# Patient Record
Sex: MALE | Race: WHITE | NOT HISPANIC OR LATINO | Employment: FULL TIME | URBAN - METROPOLITAN AREA
[De-identification: names, ages, dates, MRNs, and addresses within clinical notes are randomized per-mention and may not be internally consistent; named-entity substitution may affect disease eponyms.]

---

## 2023-06-01 ENCOUNTER — OCCUPATIONAL HEALTH (OUTPATIENT)
Dept: URGENT CARE | Facility: CLINIC | Age: 53
End: 2023-06-01

## 2023-06-01 DIAGNOSIS — Z02.1 PRE-EMPLOYMENT EXAMINATION: Primary | ICD-10-CM

## 2023-06-01 LAB
CTP QC/QA: YES
FECAL OCCULT BLOOD, POC: NEGATIVE

## 2023-06-01 PROCEDURE — 92552 AUDIOGRAM OCC MED: ICD-10-PCS | Mod: S$GLB,,, | Performed by: NURSE PRACTITIONER

## 2023-06-01 PROCEDURE — 80061 LIPID PANEL: ICD-10-PCS | Mod: QW,S$GLB,, | Performed by: NURSE PRACTITIONER

## 2023-06-01 PROCEDURE — 99499 PHYSICAL, BASIC COMPLEXITY: ICD-10-PCS | Mod: S$GLB,,, | Performed by: NURSE PRACTITIONER

## 2023-06-01 PROCEDURE — 82270 OCCULT BLOOD FECES: CPT | Mod: S$GLB,,, | Performed by: NURSE PRACTITIONER

## 2023-06-01 PROCEDURE — 86592 RPR: ICD-10-PCS | Mod: S$GLB,,, | Performed by: NURSE PRACTITIONER

## 2023-06-01 PROCEDURE — 92552 PURE TONE AUDIOMETRY AIR: CPT | Mod: S$GLB,,, | Performed by: NURSE PRACTITIONER

## 2023-06-01 PROCEDURE — 82270 POCT OCCULT BLOOD STOOL: ICD-10-PCS | Mod: S$GLB,,, | Performed by: NURSE PRACTITIONER

## 2023-06-01 PROCEDURE — 86592 SYPHILIS TEST NON-TREP QUAL: CPT | Mod: S$GLB,,, | Performed by: NURSE PRACTITIONER

## 2023-06-01 PROCEDURE — 80305 OOH NON-DOT DRUG SCREEN: ICD-10-PCS | Mod: S$GLB,,, | Performed by: NURSE PRACTITIONER

## 2023-06-01 PROCEDURE — 99499 UNLISTED E&M SERVICE: CPT | Mod: S$GLB,,, | Performed by: NURSE PRACTITIONER

## 2023-06-01 PROCEDURE — 80305 DRUG TEST PRSMV DIR OPT OBS: CPT | Mod: S$GLB,,, | Performed by: NURSE PRACTITIONER

## 2023-06-01 PROCEDURE — 87389 HIV-1 AG W/HIV-1&-2 AB AG IA: CPT | Mod: QW,S$GLB,, | Performed by: NURSE PRACTITIONER

## 2023-06-01 PROCEDURE — 82977 ASSAY OF GGT: CPT | Mod: QW,S$GLB,, | Performed by: NURSE PRACTITIONER

## 2023-06-01 PROCEDURE — 82977 GAMMA GT: ICD-10-PCS | Mod: QW,S$GLB,, | Performed by: NURSE PRACTITIONER

## 2023-06-01 PROCEDURE — 86580 TB INTRADERMAL TEST: CPT | Mod: S$GLB,,, | Performed by: NURSE PRACTITIONER

## 2023-06-01 PROCEDURE — 87389 HIV 1 / 2 ANTIBODY: ICD-10-PCS | Mod: QW,S$GLB,, | Performed by: NURSE PRACTITIONER

## 2023-06-01 PROCEDURE — 80053 COMPREHEN METABOLIC PANEL: CPT | Mod: QW,S$GLB,, | Performed by: NURSE PRACTITIONER

## 2023-06-01 PROCEDURE — 86580 POCT TB SKIN TEST: ICD-10-PCS | Mod: S$GLB,,, | Performed by: NURSE PRACTITIONER

## 2023-06-01 PROCEDURE — 80061 LIPID PANEL: CPT | Mod: QW,S$GLB,, | Performed by: NURSE PRACTITIONER

## 2023-06-01 PROCEDURE — 97750 PHYSICAL PERFORMANCE TEST: CPT | Mod: S$GLB,,, | Performed by: NURSE PRACTITIONER

## 2023-06-01 PROCEDURE — 97750 STRENGTH & FLEX: ICD-10-PCS | Mod: S$GLB,,, | Performed by: NURSE PRACTITIONER

## 2023-06-01 PROCEDURE — 80053 COMPREHENSIVE METABOLIC PANEL: ICD-10-PCS | Mod: QW,S$GLB,, | Performed by: NURSE PRACTITIONER

## 2023-06-02 ENCOUNTER — TELEPHONE (OUTPATIENT)
Dept: URGENT CARE | Facility: CLINIC | Age: 53
End: 2023-06-02

## 2023-06-03 LAB
TB INDURATION - 48 HR READ: 0 MM
TB INDURATION - 72 HR READ: 0 MM
TB SKIN TEST - 48 HR READ: NEGATIVE
TB SKIN TEST - 72 HR READ: NEGATIVE

## 2024-07-10 ENCOUNTER — OFFICE VISIT (OUTPATIENT)
Dept: URGENT CARE | Facility: CLINIC | Age: 54
End: 2024-07-10
Payer: COMMERCIAL

## 2024-07-10 VITALS
BODY MASS INDEX: 39.2 KG/M2 | SYSTOLIC BLOOD PRESSURE: 170 MMHG | HEART RATE: 88 BPM | TEMPERATURE: 99 F | WEIGHT: 280 LBS | RESPIRATION RATE: 19 BRPM | OXYGEN SATURATION: 97 % | DIASTOLIC BLOOD PRESSURE: 96 MMHG | HEIGHT: 71 IN
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
## 2024-07-10 DIAGNOSIS — S69.92XA INJURY OF LEFT FOREARM AND WRIST, INITIAL ENCOUNTER: ICD-10-CM

## 2024-07-10 DIAGNOSIS — Z02.6 ENCOUNTER RELATED TO WORKER'S COMPENSATION CLAIM: ICD-10-CM

## 2024-07-10 DIAGNOSIS — M62.838 MUSCLE SPASM: ICD-10-CM

## 2024-07-10 DIAGNOSIS — S59.912A INJURY OF LEFT FOREARM AND WRIST, INITIAL ENCOUNTER: ICD-10-CM

## 2024-07-10 DIAGNOSIS — G56.22 CUBITAL TUNNEL SYNDROME ON LEFT: ICD-10-CM

## 2024-07-10 DIAGNOSIS — S13.9XXA NECK SPRAIN, INITIAL ENCOUNTER: ICD-10-CM

## 2024-07-10 DIAGNOSIS — S40.812A ABRASION OF LEFT UPPER EXTREMITY, INITIAL ENCOUNTER: ICD-10-CM

## 2024-07-10 DIAGNOSIS — S33.5XXA SPRAIN OF LOW BACK, INITIAL ENCOUNTER: ICD-10-CM

## 2024-07-10 RX ORDER — FLUTICASONE PROPIONATE 50 MCG
2 SPRAY, SUSPENSION (ML) NASAL DAILY
COMMUNITY
Start: 2023-10-17

## 2024-07-10 RX ORDER — MINOXIDIL 2.5 MG/1
TABLET ORAL
COMMUNITY

## 2024-07-10 RX ORDER — NAPROXEN 500 MG/1
500 TABLET ORAL 2 TIMES DAILY PRN
COMMUNITY
Start: 2023-11-14 | End: 2024-07-11

## 2024-07-10 RX ORDER — MUPIROCIN 20 MG/G
OINTMENT TOPICAL 2 TIMES DAILY
COMMUNITY
Start: 2024-05-26

## 2024-07-10 RX ORDER — KETOCONAZOLE 20 MG/G
CREAM TOPICAL
COMMUNITY

## 2024-07-10 RX ORDER — KETOROLAC TROMETHAMINE 30 MG/ML
30 INJECTION, SOLUTION INTRAMUSCULAR; INTRAVENOUS
Status: COMPLETED | OUTPATIENT
Start: 2024-07-10 | End: 2024-07-10

## 2024-07-10 RX ORDER — POTASSIUM CHLORIDE 20 MEQ/1
1 TABLET, EXTENDED RELEASE ORAL 2 TIMES DAILY
COMMUNITY

## 2024-07-10 RX ORDER — DICLOFENAC SODIUM 16.05 MG/ML
SOLUTION TOPICAL
COMMUNITY
End: 2024-07-10

## 2024-07-10 RX ORDER — TERBINAFINE HYDROCHLORIDE 250 MG/1
1 TABLET ORAL DAILY
COMMUNITY

## 2024-07-10 RX ORDER — COLISTIMETHATE SODIUM 150 MG/1
INJECTION, POWDER, LYOPHILIZED, FOR SOLUTION INTRAMUSCULAR; INTRAVENOUS
COMMUNITY

## 2024-07-10 RX ORDER — METHYLPREDNISOLONE 4 MG/1
TABLET ORAL
COMMUNITY

## 2024-07-10 RX ORDER — SEMAGLUTIDE 0.68 MG/ML
INJECTION, SOLUTION SUBCUTANEOUS
COMMUNITY

## 2024-07-10 RX ORDER — POTASSIUM CHLORIDE 1500 MG/1
TABLET, EXTENDED RELEASE ORAL
COMMUNITY

## 2024-07-10 RX ORDER — AMMONIUM LACTATE 12 G/100G
LOTION TOPICAL
COMMUNITY

## 2024-07-10 RX ORDER — DOXYCYCLINE HYCLATE 100 MG
1 TABLET ORAL 2 TIMES DAILY
COMMUNITY

## 2024-07-10 RX ORDER — CETIRIZINE HYDROCHLORIDE 10 MG/1
TABLET ORAL
COMMUNITY
Start: 2023-10-18

## 2024-07-10 RX ORDER — CLOBETASOL PROPIONATE 0.46 MG/ML
SOLUTION TOPICAL
COMMUNITY

## 2024-07-10 RX ADMIN — KETOROLAC TROMETHAMINE 30 MG: 30 INJECTION, SOLUTION INTRAMUSCULAR; INTRAVENOUS at 08:07

## 2024-07-10 NOTE — LETTER
[facility] [address] [address]  Phone: [phone]  Fax: [phone]  [facility]: [email or web address]    Pt Name: Tyrone Magaña  Injury Date: 07/10/2024   Employee ID: [id number] Date of First Treatment: 07/10/2024   Company: [employer]      Appointment Time: 05:40 PM Arrived: 18:03   Provider: Placido Nicolas PA-C Time Out: 20:40     Office Treatment:   1. Work related injury    2. Neck sprain, initial encounter    3. Sprain of low back, initial encounter    4. Abrasion of left upper extremity, initial encounter    5. Injury of left forearm and wrist, initial encounter    6. Cubital tunnel syndrome on left    7. Muscle spasm      Medications Ordered This Encounter   Medications    diptheria-tetanus toxoids 2-2 Lf unit/0.5 mL injection 0.5 mL    ketorolac injection 30 mg                   Patient will follow up with Occupational Medicine tomorrow 7/11/2024 for work related injury.     Patient needs to be evaluated by Occupational medicine to determine return to work status.      Return Appointment:  Please call to schedule or present around 8am-4pm for evaluation at [facility] [place] or at this clinic [address], [place] [address]    Please call [phone] or [phone]

## 2024-07-11 ENCOUNTER — OFFICE VISIT (OUTPATIENT)
Dept: URGENT CARE | Facility: CLINIC | Age: 54
End: 2024-07-11
Payer: COMMERCIAL

## 2024-07-11 VITALS
RESPIRATION RATE: 17 BRPM | DIASTOLIC BLOOD PRESSURE: 92 MMHG | HEIGHT: 71 IN | HEART RATE: 67 BPM | BODY MASS INDEX: 39.2 KG/M2 | OXYGEN SATURATION: 97 % | SYSTOLIC BLOOD PRESSURE: 156 MMHG | TEMPERATURE: 98 F | WEIGHT: 280 LBS

## 2024-07-11 DIAGNOSIS — S40.812A ABRASION OF LEFT UPPER EXTREMITY, INITIAL ENCOUNTER: ICD-10-CM

## 2024-07-11 DIAGNOSIS — Z02.6 ENCOUNTER RELATED TO WORKER'S COMPENSATION CLAIM: ICD-10-CM

## 2024-07-11 DIAGNOSIS — S13.9XXA NECK SPRAIN, INITIAL ENCOUNTER: Primary | ICD-10-CM

## 2024-07-11 DIAGNOSIS — S33.5XXA SPRAIN OF LOW BACK, INITIAL ENCOUNTER: ICD-10-CM

## 2024-07-11 DIAGNOSIS — S50.12XA CONTUSION OF LEFT ELBOW AND FOREARM, INITIAL ENCOUNTER: ICD-10-CM
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
## 2024-07-11 RX ORDER — MUPIROCIN 20 MG/G
OINTMENT TOPICAL 2 TIMES DAILY
Qty: 22 G | Refills: 0 | Status: SHIPPED | OUTPATIENT
Start: 2024-07-11

## 2024-07-11 RX ORDER — NAPROXEN 500 MG/1
500 TABLET ORAL 2 TIMES DAILY WITH MEALS
Qty: 30 TABLET | Refills: 0 | Status: SHIPPED | OUTPATIENT
Start: 2024-07-11
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
## 2024-07-11 NOTE — LETTER
[facility] [address] [address]  Phone: [phone]  Fax: [phone]  [facility]: [email or web address]    Pt Name: Tyrone Magaña  Injury Date: 07/10/2024   Employee ID: [id number] Date of First Treatment: 07/11/2024   Company: [employer]      Appointment Time: 08:30 AM Arrived: 8:30 AM   Provider: Marcin Iniguez PA-C Time Out: 9:48 AM     Office Treatment:   1. Neck sprain, initial encounter    2. Encounter related to worker's compensation claim    3. Sprain of low back, initial encounter    4. Contusion of left elbow and forearm, initial encounter    5. Abrasion of left upper extremity, initial encounter    6. Work related injury      Medications Ordered This Encounter   Medications    mupirocin (BACTROBAN) 2 % ointment    naproxen (NAPROSYN) 500 MG tablet      Patient Instructions: Apply ice 24-48 hours then apply heat/warm soaks          Return Appointment: 7/16/2024 at 8:30 AM    STACIE

## 2024-07-11 NOTE — PATIENT INSTRUCTIONS
PLEASE READ YOUR DISCHARGE INSTRUCTIONS ENTIRELY AS IT CONTAINS IMPORTANT INFORMATION.  - OTC Tylenol/anti-inflammatory every 6 hours with food as needed for pain.  - continue heat/ice compression, rice therapy, and muscle stretches.     - You need to evaluated by Occupational medicine to determine your return to work status.     - if no improvement or worsening symptoms, recommend follow-up with *Occupational medicine for further evaluation and for work related injury.  Please call the number below to set up appointment; a referral has been placed. Or you can choose another location on form given.     [facility]  [address] [place] [address]  Please call [phone] OR [phone]    -You must understand that you've received an Urgent Care treatment only and that you may be released before all your medical problems are known or treated. You, the patient, will arrange for follow up care.    - If your condition worsens or fails to improve we recommend that you receive another evaluation at the emergency room immediately. Strict ER versus clinic precautions given.      RED FLAGS/WARNING SYMPTOMS DISCUSSED WITH PATIENT THAT WOULD WARRANT EMERGENT MEDICAL ATTENTION. Patient aware and verbalized understanding.

## 2024-07-15 ENCOUNTER — OFFICE VISIT (OUTPATIENT)
Dept: URGENT CARE | Facility: CLINIC | Age: 54
End: 2024-07-15
Payer: COMMERCIAL

## 2024-07-15 VITALS
HEIGHT: 71 IN | WEIGHT: 280 LBS | DIASTOLIC BLOOD PRESSURE: 86 MMHG | SYSTOLIC BLOOD PRESSURE: 148 MMHG | BODY MASS INDEX: 39.2 KG/M2 | RESPIRATION RATE: 18 BRPM | OXYGEN SATURATION: 97 % | HEART RATE: 78 BPM | TEMPERATURE: 98 F

## 2024-07-15 DIAGNOSIS — S13.9XXD NECK SPRAIN, SUBSEQUENT ENCOUNTER: Primary | ICD-10-CM

## 2024-07-15 DIAGNOSIS — S33.5XXA SPRAIN OF LOW BACK, INITIAL ENCOUNTER: ICD-10-CM

## 2024-07-15 DIAGNOSIS — Z02.6 ENCOUNTER RELATED TO WORKER'S COMPENSATION CLAIM: ICD-10-CM
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
## 2024-07-15 DIAGNOSIS — S50.12XD CONTUSION OF LEFT ELBOW AND FOREARM, SUBSEQUENT ENCOUNTER: ICD-10-CM

## 2024-07-15 DIAGNOSIS — S40.812D ABRASION OF LEFT UPPER EXTREMITY, SUBSEQUENT ENCOUNTER: ICD-10-CM

## 2024-07-15 DIAGNOSIS — S33.5XXD SPRAIN OF LOW BACK, SUBSEQUENT ENCOUNTER: ICD-10-CM

## 2024-07-15 DIAGNOSIS — S40.812A ABRASION OF LEFT UPPER EXTREMITY, INITIAL ENCOUNTER: ICD-10-CM

## 2024-07-15 PROCEDURE — 99213 OFFICE O/P EST LOW 20 MIN: CPT | Mod: S$GLB,,, | Performed by: PHYSICIAN ASSISTANT

## 2024-07-15 RX ORDER — NAPROXEN 500 MG/1
500 TABLET ORAL 2 TIMES DAILY WITH MEALS
Qty: 30 TABLET | Refills: 0 | Status: SHIPPED | OUTPATIENT
Start: 2024-07-15

## 2024-07-15 RX ORDER — MUPIROCIN 20 MG/G
OINTMENT TOPICAL 2 TIMES DAILY
Qty: 22 G | Refills: 0 | Status: SHIPPED | OUTPATIENT
Start: 2024-07-15

## 2024-07-15 NOTE — PROGRESS NOTES
Subjective:      Patient ID: Tyrone Magaña is a 53 y.o. male.    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  He reports much improved since last office visit.  Says he is about 50% improved.  He reports mild-to-moderate pain in the neck, low back and left forearm.  Says he has been taking naproxen and applying warm soaks with improvement.  Patient also reports improvement in the numbness and tingling in the left little and ring fingers. MEB    Arm Pain   Associated symptoms include numbness.     Constitution: Positive for activity change.   Neck: Positive for neck pain and neck stiffness.   Musculoskeletal:  Positive for pain, joint pain and back pain.   Skin:  Positive for abrasion. Negative for erythema.   Neurological:  Positive for numbness and tingling.     Objective:     Physical Exam  Vitals and nursing note reviewed.   Constitutional:       General: He is not in acute distress.     Appearance: He is well-developed. He is not diaphoretic.   HENT:      Head: Normocephalic and atraumatic. No raccoon eyes or Martínez's sign.      Right Ear: Hearing and external ear normal.      Left Ear: Hearing and external ear normal.      Nose: Nose normal. No nasal deformity.   Eyes:      General: Lids are normal.      Conjunctiva/sclera: Conjunctivae normal.   Neck:      Trachea: Trachea normal.     Cardiovascular:      Pulses: Normal pulses.           Radial pulses are 2+ on the right side and 2+ on the left side.   Pulmonary:      Effort: Pulmonary effort is normal. No  respiratory distress.   Musculoskeletal:      Left elbow: No swelling or deformity. Normal range of motion. Tenderness present in medial epicondyle and lateral epicondyle.      Left forearm: Tenderness present. No swelling or deformity.      Left wrist: Normal.      Cervical back: Tenderness present. No deformity. Pain with movement and muscular tenderness present. No spinous process tenderness. Normal range of motion.      Thoracic back: Tenderness present. No swelling.      Lumbar back: Tenderness present. Decreased range of motion.   Skin:     General: Skin is warm and dry.      Findings: Abrasion present. No erythema.             Comments: Linear abrasion left forearm.  No SSI.   Neurological:      Mental Status: He is alert.      Sensory: Sensation is intact. No sensory deficit.      Motor: Motor function is intact.   Psychiatric:         Attention and Perception: Attention normal. He is attentive.         Speech: Speech normal.         Behavior: Behavior normal.        Assessment:      1. Neck sprain, subsequent encounter    2. Encounter related to worker's compensation claim    3. Sprain of low back, subsequent encounter    4. Contusion of left elbow and forearm, subsequent encounter    5. Abrasion of left upper extremity, subsequent encounter    6. Work related injury    7. Sprain of low back, initial encounter    8. Abrasion of left upper extremity, initial encounter      Plan:       Patient has shown improvement since last office visit.  Patient has full active range of motion of the neck and lumbar spine.  He continues to have mild-to-moderate tenderness about the left C2-C4 paraspinal region.  There is also tenderness about the midthoracic and lumbar regions.  Patient's left forearm abrasion is healing well without SSI.  Patient may return to duty without restriction.  Return to clinic in 2 weeks or sooner as needed.  Patient verbalized understanding and agreement.    Medications Ordered This Encounter    Medications    mupirocin (BACTROBAN) 2 % ointment     Sig: Apply topically 2 (two) times daily. AAA     Dispense:  22 g     Refill:  0    naproxen (NAPROSYN) 500 MG tablet     Sig: Take 1 tablet (500 mg total) by mouth 2 (two) times daily with meals.     Dispense:  30 tablet     Refill:  0     Patient Instructions: Attention not to aggravate affected area, Daily home exercises/warm soaks   Restrictions: Regular Duty  Follow up in about 16 days (around 7/31/2024) for Reassessment.

## 2024-07-15 NOTE — LETTER
[facility] [address] [address]  Phone: [phone]  Fax: [phone]  [facility]: [id number]    Pt Name: Tyrone Magaña  Injury Date: 07/10/2024   Employee ID: [id number] Date of Treatment: 07/15/2024   Company: [employer]      Appointment Time: 11:50 AM Arrived: 10:50 AM   Provider: Marcin Iniguez PA-C Time Out: 11:28 AM     Office Treatment:   1. Neck sprain, subsequent encounter    2. Encounter related to worker's compensation claim    3. Sprain of low back, subsequent encounter    4. Contusion of left elbow and forearm, subsequent encounter    5. Abrasion of left upper extremity, subsequent encounter    6. Work related injury    7. Sprain of low back, initial encounter    8. Abrasion of left upper extremity, initial encounter      Medications Ordered This Encounter   Medications    mupirocin (BACTROBAN) 2 % ointment    naproxen (NAPROSYN) 500 MG tablet      Patient Instructions: Attention not to aggravate affected area, Daily home exercises/warm soaks      Restrictions: Regular Duty (May return to duty without restrictions.)     Return Appointment: 7/31/2024 at 9:30 AM    STACIE

## 2024-07-25 ENCOUNTER — OFFICE VISIT (OUTPATIENT)
Dept: URGENT CARE | Facility: CLINIC | Age: 54
End: 2024-07-25
Payer: COMMERCIAL

## 2024-07-25 VITALS
DIASTOLIC BLOOD PRESSURE: 89 MMHG | TEMPERATURE: 98 F | SYSTOLIC BLOOD PRESSURE: 155 MMHG | RESPIRATION RATE: 18 BRPM | HEIGHT: 71 IN | OXYGEN SATURATION: 98 % | HEART RATE: 74 BPM | WEIGHT: 280 LBS | BODY MASS INDEX: 39.2 KG/M2

## 2024-07-25 DIAGNOSIS — S16.1XXD CERVICAL MUSCLE STRAIN, SUBSEQUENT ENCOUNTER: Primary | ICD-10-CM

## 2024-07-25 DIAGNOSIS — S50.12XD CONTUSION OF LEFT ELBOW AND FOREARM, SUBSEQUENT ENCOUNTER: ICD-10-CM

## 2024-07-25 DIAGNOSIS — S40.812D: ICD-10-CM

## 2024-07-25 DIAGNOSIS — Z02.6 ENCOUNTER RELATED TO WORKER'S COMPENSATION CLAIM: ICD-10-CM

## 2024-07-25 DIAGNOSIS — S39.012D LUMBAR STRAIN, SUBSEQUENT ENCOUNTER: ICD-10-CM

## 2024-07-25 RX ORDER — MELOXICAM 15 MG/1
15 TABLET ORAL DAILY
Qty: 14 TABLET | Refills: 0 | Status: SHIPPED | OUTPATIENT
Start: 2024-07-25 | End: 2024-08-08

## 2024-07-25 RX ORDER — METHOCARBAMOL 500 MG/1
500 TABLET, FILM COATED ORAL NIGHTLY PRN
Qty: 20 TABLET | Refills: 0 | Status: SHIPPED | OUTPATIENT
Start: 2024-07-25 | End: 2024-08-14

## 2024-07-25 RX ORDER — NAPROXEN 500 MG/1
500 TABLET ORAL 2 TIMES DAILY PRN
Qty: 28 TABLET | Refills: 0 | Status: SHIPPED | OUTPATIENT
Start: 2024-07-25 | End: 2024-08-08

## 2024-07-25 NOTE — PROGRESS NOTES
Subjective:      Patient ID: Tyrone Magaña is a 53 y.o. male.    Chief Complaint: Back Pain (LOWER BACK, L ARM, NECK, L/R SHOULDER)    Mr. Magaña presents for follow up of multiple injuries, DOI 7/10/24.  He is a  with [employer].  He reports continued pain in the neck, back and left forearm.  He is cleaning and dressing the wound daily.  No purulent drainage, erythema, or warmth.  He continues to have some tingling in the 4/5th fingers of the left hand.  He has soreness in the left forearm with gripping and twisting tasks.  He denies any radiating arm or leg pain, paresthesias, weakness, saddle anesthesia or B/B dysfunction.  He reports the pain is manageable with the naproxen.  He is having trouble sleeping through the night due to pain.    This patient is new to me.  I have reviewed all previous notes & imaging in the chart that pertain to this injury.    See MA note below.  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     Constitution: Negative.   HENT: Negative.     Neck: Positive for neck pain.   Cardiovascular: Negative.    Respiratory: Negative.     Musculoskeletal:  Positive for pain, back pain and muscle ache.   Skin:  Positive for abrasion. Negative for erythema.   Neurological:  Positive for tingling. Negative for numbness.     Objective:     Physical Exam  Vitals and nursing note reviewed.   Constitutional:       General: He is not in acute distress.     Appearance: He is well-developed. He is not diaphoretic.   HENT:      Head: Normocephalic and atraumatic. No raccoon eyes or Martínez's sign.      Right Ear: Hearing and external ear normal.      Left Ear: Hearing and external ear  normal.      Nose: Nose normal. No nasal deformity.   Eyes:      General: Lids are normal.      Conjunctiva/sclera: Conjunctivae normal.   Neck:      Trachea: Trachea normal.     Cardiovascular:      Pulses: Normal pulses.           Radial pulses are 2+ on the right side and 2+ on the left side.   Pulmonary:      Effort: Pulmonary effort is normal. No respiratory distress.   Musculoskeletal:      Left elbow: No swelling or deformity. Normal range of motion. Tenderness present in medial epicondyle and lateral epicondyle.      Left forearm: Tenderness present. No swelling or deformity.      Left wrist: Normal.      Cervical back: Tenderness present. No deformity. Muscular tenderness present. No pain with movement or spinous process tenderness. Normal range of motion.      Thoracic back: No swelling or tenderness.      Lumbar back: Tenderness present. Normal range of motion.      Comments: BUE, BLE full strength.   Skin:     General: Skin is warm and dry.      Findings: Abrasion present. No erythema.             Comments: Linear abrasion left forearm.  No erythema, swelling or drainage.   Neurological:      Mental Status: He is alert.      Sensory: Sensation is intact. No sensory deficit.      Motor: Motor function is intact.   Psychiatric:         Attention and Perception: Attention normal. He is attentive.         Speech: Speech normal.         Behavior: Behavior normal.        Assessment:      1. Cervical muscle strain, subsequent encounter    2. Lumbar strain, subsequent encounter    3. Contusion of left elbow and forearm, subsequent encounter    4. Abrasion of left arm, subsequent encounter    5. Encounter related to worker's compensation claim      Plan:     L arm wound is healing well, continue local wound care.  Discussed s/s of infection, he will call/RTC with any concerns.  He would like to try mobic, but requests a refill of the naproxen as a back up, in case the mobic upsets his stomach.  Rx for robaxin at  night PRN, I discussed precautions of no driving while taking this medication & do not take within 10 hours of the work shift, as the medication may cause drowsiness.  He will remain at regular duty at this time.  Follow up in 2 weeks and consider PT if continued neck/back pain.    Medications Ordered This Encounter   Medications    meloxicam (MOBIC) 15 MG tablet     Sig: Take 1 tablet (15 mg total) by mouth once daily. Do not take naproxen with this medication. for 14 days     Dispense:  14 tablet     Refill:  0    methocarbamoL (ROBAXIN) 500 MG Tab     Sig: Take 1 tablet (500 mg total) by mouth nightly as needed (spasm).     Dispense:  20 tablet     Refill:  0    naproxen (NAPROSYN) 500 MG tablet     Sig: Take 1 tablet (500 mg total) by mouth 2 (two) times daily as needed (pain).     Dispense:  28 tablet     Refill:  0     Patient Instructions: Keep dressing clean/dry/covered, Attention not to aggravate affected area, Daily home exercises/warm soaks (You may take the naproxen only if the mobic upsets your stomach.  Do not take the naproxen along with the mobic.)   Restrictions: Regular Duty  Follow up in about 2 weeks (around 8/8/2024).

## 2024-07-25 NOTE — LETTER
[facility] [address] [address]  Phone: [phone]  Fax: [phone]  [facility]: [id number]    Pt Name: Tyrone Magaña  Injury Date: 07/10/2024   Employee ID:  Date of Treatment: 07/25/2024   Company: [employer]      Appointment Time: 02:15 PM Arrived: 2:08 pm   Provider: Adrianne Phelps PA-C Time Out: 3:10 pm     Office Treatment:   1. Cervical muscle strain, subsequent encounter    2. Lumbar strain, subsequent encounter    3. Contusion of left elbow and forearm, subsequent encounter    4. Abrasion of left arm, subsequent encounter    5. Encounter related to worker's compensation claim      Medications Ordered This Encounter   Medications    meloxicam (MOBIC) 15 MG tablet    methocarbamoL (ROBAXIN) 500 MG Tab    naproxen (NAPROSYN) 500 MG tablet      Patient Instructions: Keep dressing clean/dry/covered, Attention not to aggravate affected area, Daily home exercises/warm soaks (You may take the naproxen only if the mobic upsets your stomach.  Do not take the naproxen along with the mobic.)    Restrictions: Regular Duty     Return Appointment: 8/8/2024 at 2:30 Yonatan

## 2024-08-22 ENCOUNTER — OFFICE VISIT (OUTPATIENT)
Dept: URGENT CARE | Facility: CLINIC | Age: 54
End: 2024-08-22
Payer: COMMERCIAL

## 2024-08-22 VITALS
RESPIRATION RATE: 17 BRPM | DIASTOLIC BLOOD PRESSURE: 83 MMHG | SYSTOLIC BLOOD PRESSURE: 141 MMHG | BODY MASS INDEX: 39.06 KG/M2 | TEMPERATURE: 98 F | HEART RATE: 69 BPM | OXYGEN SATURATION: 97 % | HEIGHT: 71 IN | WEIGHT: 279 LBS

## 2024-08-22 DIAGNOSIS — S39.012D LUMBAR STRAIN, SUBSEQUENT ENCOUNTER: ICD-10-CM

## 2024-08-22 DIAGNOSIS — Z02.6 ENCOUNTER RELATED TO WORKER'S COMPENSATION CLAIM: ICD-10-CM
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
## 2024-08-22 DIAGNOSIS — S16.1XXD CERVICAL MUSCLE STRAIN, SUBSEQUENT ENCOUNTER: Primary | ICD-10-CM

## 2024-08-22 DIAGNOSIS — R20.0 NUMBNESS OF LEFT HAND: ICD-10-CM

## 2024-08-22 RX ORDER — NAPROXEN 500 MG/1
500 TABLET ORAL 2 TIMES DAILY WITH MEALS
Qty: 30 TABLET | Refills: 1 | Status: SHIPPED | OUTPATIENT
Start: 2024-08-22

## 2024-08-22 NOTE — PROGRESS NOTES
Subjective:      Patient ID: Tyrone Magaña is a 53 y.o. male.    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  He reports improvement since last office visit.  He still having mild-to-moderate neck and low back pain.  He reports occasional numbness in the ulnar aspect of the left hand.  Says this has improved greatly.  Patient is taking naproxen twice daily as needed with improved pain.  Patient is working regular duty. MEB    Injury  Associated symptoms include neck pain.     Constitution: Positive for activity change.   Neck: Positive for neck pain and neck stiffness.   Musculoskeletal:  Positive for back pain.   Skin:  Positive for wound. Negative for erythema.     Objective:     Physical Exam  Vitals and nursing note reviewed.   Constitutional:       General: He is not in acute distress.     Appearance: He is well-developed. He is not diaphoretic.   HENT:      Head: Normocephalic and atraumatic. No raccoon eyes or Martínez's sign.      Right Ear: Hearing and external ear normal.      Left Ear: Hearing and external ear normal.      Nose: Nose normal. No nasal deformity.   Eyes:      General: Lids are normal.      Conjunctiva/sclera: Conjunctivae normal.   Neck:      Trachea: Trachea normal.     Cardiovascular:      Pulses: Normal pulses.           Radial pulses are 2+ on the right side and 2+ on the left side.   Pulmonary:      Effort: Pulmonary effort is normal. No respiratory distress.   Musculoskeletal:      Left elbow: No swelling or deformity. Normal range of motion.  Tenderness present in medial epicondyle and lateral epicondyle.      Left forearm: Tenderness present. No swelling or deformity.      Left wrist: Normal.      Cervical back: Tenderness present. No deformity. Pain with movement and muscular tenderness present. No spinous process tenderness. Normal range of motion.      Thoracic back: Tenderness present. No swelling.      Lumbar back: Tenderness present. Decreased range of motion.   Skin:     General: Skin is warm and dry.      Findings: Abrasion present. No erythema.             Comments: Linear abrasion left forearm almost completely healed.  No SSI.   Neurological:      Mental Status: He is alert.      Sensory: Sensation is intact. No sensory deficit.      Motor: Motor function is intact.   Psychiatric:         Attention and Perception: Attention normal. He is attentive.         Speech: Speech normal.         Behavior: Behavior normal.        Assessment:      1. Cervical muscle strain, subsequent encounter    2. Encounter related to worker's compensation claim    3. Lumbar strain, subsequent encounter    4. Numbness of left hand    5. Work related injury      Plan:       Patient with persistent neck and back injuries.  I will order physical therapy.  Patient was given home exercises to perform daily.  Continue naproxen twice daily as needed.  Follow-up in 1 month or sooner as needed.  Patient verbalized understanding and agreement.    Medications Ordered This Encounter   Medications    naproxen (NAPROSYN) 500 MG tablet     Sig: Take 1 tablet (500 mg total) by mouth 2 (two) times daily with meals.     Dispense:  30 tablet     Refill:  1     Patient Instructions: Attention not to aggravate affected area, Daily home exercises/warm soaks, PT to be scheduled once authorized   Restrictions: Regular Duty  Follow up in about 5 weeks (around 9/26/2024) for Reassessment.

## 2024-08-22 NOTE — LETTER
[facility] [address] [address]  Phone: [phone]  Fax: [phone]  [facility]: [id number]    Pt Name: Tyrone Magaña  Injury Date: 07/10/2024   Employee ID: [id number] Date of Treatment: 08/22/2024   Company: [employer]      Appointment Time: 02:45 PM Arrived: 3:00 PM   Provider: Marcin Iniguez PA-C Time Out: 4:05 PM     Office Treatment:   1. Cervical muscle strain, subsequent encounter    2. Encounter related to worker's compensation claim    3. Lumbar strain, subsequent encounter    4. Numbness of left hand    5. Work related injury      Medications Ordered This Encounter   Medications    naproxen (NAPROSYN) 500 MG tablet      Patient Instructions: Attention not to aggravate affected area, Daily home exercises/warm soaks, PT to be scheduled once authorized      Restrictions: Regular Duty     Return Appointment: 9/26/2024 at 10:00 AM.    STACIE

## 2024-08-28 ENCOUNTER — TELEPHONE (OUTPATIENT)
Dept: URGENT CARE | Facility: CLINIC | Age: 54
End: 2024-08-28

## 2024-08-28 NOTE — TELEPHONE ENCOUNTER
Patient called to get an update on his PT Referral. He states that he has not heard anything about this referral and was wondering why no one has called him. I informed him that they have been working on his referral and they are just waiting on the  to approve the referral and someone will be reaching out to him.

## 2024-09-12 ENCOUNTER — CLINICAL SUPPORT (OUTPATIENT)
Dept: REHABILITATION | Facility: HOSPITAL | Age: 54
End: 2024-09-12
Payer: COMMERCIAL

## 2024-09-12 DIAGNOSIS — R29.898 DECREASED RANGE OF MOTION OF NECK: ICD-10-CM

## 2024-09-12 DIAGNOSIS — R29.3 POSTURE ABNORMALITY: Primary | ICD-10-CM

## 2024-09-12 PROCEDURE — 97110 THERAPEUTIC EXERCISES: CPT

## 2024-09-12 PROCEDURE — 97161 PT EVAL LOW COMPLEX 20 MIN: CPT

## 2024-09-19 ENCOUNTER — CLINICAL SUPPORT (OUTPATIENT)
Dept: REHABILITATION | Facility: HOSPITAL | Age: 54
End: 2024-09-19
Payer: OTHER MISCELLANEOUS

## 2024-09-19 DIAGNOSIS — R29.3 POSTURE ABNORMALITY: Primary | ICD-10-CM

## 2024-09-19 DIAGNOSIS — R29.898 DECREASED RANGE OF MOTION OF NECK: ICD-10-CM

## 2024-09-19 PROCEDURE — 97112 NEUROMUSCULAR REEDUCATION: CPT

## 2024-09-19 PROCEDURE — 97140 MANUAL THERAPY 1/> REGIONS: CPT

## 2024-09-19 PROCEDURE — 97110 THERAPEUTIC EXERCISES: CPT

## 2024-09-19 NOTE — PROGRESS NOTES
"  Physical Therapy Daily Treatment Note     [id number]    Therapy Diagnosis:   Encounter Diagnoses   Name Primary?    Posture abnormality Yes    Decreased range of motion of neck      Physician: Marcin Iniguez PA-C    Visit Date: 9/19/2024    Physician Orders: PT Eval and Treat   Medical Diagnosis from Referral:   S16.1XXD (ICD-10-CM) - Cervical muscle strain, subsequent encounter   S39.012D (ICD-10-CM) - Lumbar strain, subsequent encounter   Y99.0 (ICD-10-CM) - Work related injury      Evaluation Date: 9/12/2024  Authorization Period Expiration: 8/22/2025  Plan of Care Expiration: 11/21/2024  Progress Note Due: 10/12/2024  Date of Surgery: N/A  Visit # / Visits authorized: 2/ 12   FOTO: 1/ 3    1st FOTO Follow up:   2nd FOTO Follow up:     Time In: 300pm  Time Out: 345pm (requested to leave early)  Total Billable Time: 45 minutes    Precautions: Standard    Subjective     Pt reports: that he feels about the same at this time as he did last week. He was unable to do the exercises as much as he would like at home.   He was partially compliant with home exercise program.  Response to previous treatment: no change   Functional change: ongoing    Pain: 5/10  Location: left neck and upper trap area, extending into the left thoracic spine and scapular region      Objective     Tyrone received therapeutic exercises to develop strength, endurance, and ROM for 25 minutes including:    Seated UBE, 2' forward and backward  Seated L first rib mobilization with strap, 2 x 10  Ulnar nerve glides, 2 x 10  Seated thoracic extension over a chair, 3 x 8  Seated chicken wings, 3 x 8  Prone mid trap level 2, 2 x 10    Tyrone received the following manual therapy techniques: Joint mobilizations were applied to the: spine and shoulders for 10 minutes, including:    Upper cervical flexion mobs, grade III  MET to improve upper cervical rotation to the L, 4 x 5" holds  Prone thoracic spine and " "CT junction mobs, grade IV  Seated L first rib mobs, grade III    Tyrone participated in neuromuscular re-education activities to improve: Coordination, Kinesthetic, Sense, and Proprioception for 10 minutes. The following activities were included:    Chin tucks with biofeedback cuff, 3 x 8 with 3" holds  B shoulder ER with GTB (cueing for chin tuck), 3 x 8  Wall slides, 3 x 10    Tyrone participated in dynamic functional therapeutic activities to improve functional performance for 0 minutes, including:    Home Exercises Provided and Patient Education Provided     Education provided:   - HEP  - Plan of Care  - Nerve irritation leading to N/T    Written Home Exercises Provided: Patient instructed to cont prior HEP.  Exercises were reviewed and Tyrone was able to demonstrate them prior to the end of the session.  Tyrone demonstrated good  understanding of the education provided.     See EMR under Patient Instructions for exercises provided  9/12/2024 .    Assessment     Tyrone presents today with continued L sided symptoms at this time. He had trouble tolerating high graded manual interventions so they were kept to a low grade today to ensure his symptoms are not exacerbated. The biofeedback cuff was introduced today with chin tucks and he did have some trouble with motor control initially but this improved as the exercise went on. Postural control exercises were progressed today with good tolerance.     Tyrone Is progressing well towards his goals.   Pt prognosis is Good.     Pt will continue to benefit from skilled outpatient physical therapy to address the deficits listed in the problem list box on initial evaluation, provide pt/family education and to maximize pt's level of independence in the home and community environment.     Pt's spiritual, cultural and educational needs considered and pt agreeable to plan of care and goals.     Anticipated barriers to physical therapy: work requirements     Goals:   Short Term Goals " (5 Weeks):   1. Pt will be independent with HEP to supplement PT in improving pain free cervical mobility. (Progressing)  2. Pt will improve cervical AROM into L rotation by 5 deg to improve cervical mobility for driving. (Progressing)  3. Pt will improve UE strength by 1/2 MMT grade to improve strength for lifting and carrying tasks. (Progressing)  4. Pt will demonstrate improved sitting posture to decrease pain experienced in head and neck. (Progressing)  Long Term Goals (10 Weeks):   1. Pt will improve FOTO to </=61% limitation to improve perceived limitation with changing and maintaining mobility. (Progressing)  2. Pt will improve cervical AROM into L rotation by 10 deg to improve cervical mobility for driving. (Progressing)  3. Pt will improve UE strength by 1 MMT grade to improve strength for lifting and carrying tasks. (Progressing)  4. Pt will report no pain with cervical AROM in all planes to promote QOL. (Progressing)    Plan     Continue to progress per POC.    MAYE ALVARENGA, PT, DPT  Board Certified in Orthopedic Physical Therapy

## 2024-09-27 ENCOUNTER — TELEPHONE (OUTPATIENT)
Dept: URGENT CARE | Facility: CLINIC | Age: 54
End: 2024-09-27

## 2024-09-27 NOTE — TELEPHONE ENCOUNTER
Called patient in reference to his missed [facility] Health Appointment and there was no answer, I received the patients voicemail, left a message for the patient and the reason for the call.  EUGENIA

## 2024-09-30 ENCOUNTER — TELEPHONE (OUTPATIENT)
Dept: URGENT CARE | Facility: CLINIC | Age: 54
End: 2024-09-30

## 2024-09-30 NOTE — TELEPHONE ENCOUNTER
Called patient in reference to his missed [facility] Appointment and there was no answer, I received the patients voicemail, left a message for the patient and the reason for the call.